# Patient Record
Sex: MALE | Race: WHITE
[De-identification: names, ages, dates, MRNs, and addresses within clinical notes are randomized per-mention and may not be internally consistent; named-entity substitution may affect disease eponyms.]

---

## 2018-01-24 ENCOUNTER — HOSPITAL ENCOUNTER (OUTPATIENT)
Dept: HOSPITAL 74 - JER | Age: 68
Setting detail: OBSERVATION
LOS: 1 days | Discharge: LEFT BEFORE DISCHARGE | End: 2018-01-25
Payer: COMMERCIAL

## 2018-01-24 VITALS — BODY MASS INDEX: 28.2 KG/M2

## 2018-01-24 DIAGNOSIS — Z95.5: ICD-10-CM

## 2018-01-24 DIAGNOSIS — M19.90: ICD-10-CM

## 2018-01-24 DIAGNOSIS — E78.5: ICD-10-CM

## 2018-01-24 DIAGNOSIS — J18.9: ICD-10-CM

## 2018-01-24 DIAGNOSIS — I25.2: ICD-10-CM

## 2018-01-24 DIAGNOSIS — I25.10: ICD-10-CM

## 2018-01-24 DIAGNOSIS — F17.210: ICD-10-CM

## 2018-01-24 DIAGNOSIS — M54.5: ICD-10-CM

## 2018-01-24 DIAGNOSIS — R07.81: ICD-10-CM

## 2018-01-24 DIAGNOSIS — Z79.82: ICD-10-CM

## 2018-01-24 DIAGNOSIS — G89.29: ICD-10-CM

## 2018-01-24 DIAGNOSIS — I10: ICD-10-CM

## 2018-01-24 DIAGNOSIS — J44.1: Primary | ICD-10-CM

## 2018-01-24 LAB
ALBUMIN SERPL-MCNC: 4.5 G/DL (ref 3.4–5)
ALP SERPL-CCNC: 78 U/L (ref 45–117)
ALT SERPL-CCNC: 33 U/L (ref 12–78)
ANION GAP SERPL CALC-SCNC: 13 MMOL/L (ref 8–16)
ARTERIAL BLOOD GAS PCO2: 36.4 MMHG (ref 35–45)
ARTERIAL PATENCY WRIST A: POSITIVE
AST SERPL-CCNC: 23 U/L (ref 15–37)
BASE EXCESS BLDA CALC-SCNC: 2.4 MEQ/L (ref -2–2)
BASOPHILS # BLD: 0.1 % (ref 0–2)
BILIRUB SERPL-MCNC: 0.6 MG/DL (ref 0.2–1)
BUN SERPL-MCNC: 7 MG/DL (ref 7–18)
CALCIUM SERPL-MCNC: 8.9 MG/DL (ref 8.5–10.1)
CHLORIDE SERPL-SCNC: 102 MMOL/L (ref 98–107)
CO2 SERPL-SCNC: 25 MMOL/L (ref 21–32)
COHGB MFR BLD: 1.2 GM% (ref 0.5–2)
CREAT SERPL-MCNC: 0.8 MG/DL (ref 0.7–1.3)
DEPRECATED RDW RBC AUTO: 12.6 % (ref 11.9–15.9)
EOSINOPHIL # BLD: 0 % (ref 0–4.5)
GLUCOSE SERPL-MCNC: 103 MG/DL (ref 74–106)
HCT VFR BLD CALC: 47.9 % (ref 35.4–49)
HGB BLD-MCNC: 16.3 GM/DL (ref 11.7–16.9)
LYMPHOCYTES # BLD: 2.4 % (ref 8–40)
MCH RBC QN AUTO: 31.8 PG (ref 25.7–33.7)
MCHC RBC AUTO-ENTMCNC: 34.1 G/DL (ref 32–35.9)
MCV RBC: 93.4 FL (ref 80–96)
MONOCYTES # BLD AUTO: 5.1 % (ref 3.8–10.2)
NEUTROPHILS # BLD: 92.4 % (ref 42.8–82.8)
PLATELET # BLD AUTO: 233 K/MM3 (ref 134–434)
PMV BLD: 9.1 FL (ref 7.5–11.1)
PO2 BLDA: 62.4 MMHG (ref 80–100)
POTASSIUM SERPLBLD-SCNC: 4.1 MMOL/L (ref 3.5–5.1)
PROT SERPL-MCNC: 7.9 G/DL (ref 6.4–8.2)
RBC # BLD AUTO: 5.13 M/MM3 (ref 4–5.6)
SAO2 % BLDA: 92.6 % (ref 90–98.9)
SODIUM SERPL-SCNC: 140 MMOL/L (ref 136–145)
WBC # BLD AUTO: 10.2 K/MM3 (ref 4–10)

## 2018-01-24 PROCEDURE — G0378 HOSPITAL OBSERVATION PER HR: HCPCS

## 2018-01-24 PROCEDURE — 3E0F7GC INTRODUCTION OF OTHER THERAPEUTIC SUBSTANCE INTO RESPIRATORY TRACT, VIA NATURAL OR ARTIFICIAL OPENING: ICD-10-PCS

## 2018-01-24 PROCEDURE — 3E0333Z INTRODUCTION OF ANTI-INFLAMMATORY INTO PERIPHERAL VEIN, PERCUTANEOUS APPROACH: ICD-10-PCS

## 2018-01-24 PROCEDURE — 3E03329 INTRODUCTION OF OTHER ANTI-INFECTIVE INTO PERIPHERAL VEIN, PERCUTANEOUS APPROACH: ICD-10-PCS

## 2018-01-24 RX ADMIN — IPRATROPIUM BROMIDE AND ALBUTEROL SULFATE SCH AMP: .5; 3 SOLUTION RESPIRATORY (INHALATION) at 17:15

## 2018-01-24 RX ADMIN — IPRATROPIUM BROMIDE AND ALBUTEROL SULFATE SCH AMP: .5; 3 SOLUTION RESPIRATORY (INHALATION) at 17:33

## 2018-01-24 RX ADMIN — IPRATROPIUM BROMIDE AND ALBUTEROL SULFATE SCH AMP: .5; 3 SOLUTION RESPIRATORY (INHALATION) at 17:00

## 2018-01-24 RX ADMIN — IPRATROPIUM BROMIDE AND ALBUTEROL SULFATE SCH AMP: .5; 3 SOLUTION RESPIRATORY (INHALATION) at 16:45

## 2018-01-24 NOTE — PDOC
Attending Attestation





- Resident


Resident Name: Venus Cha





- ED Attending Attestation


I have performed the following: I have examined & evaluated the patient, The 

case was reviewed & discussed with the resident, I agree w/resident's findings 

& plan, Exceptions are as noted





- HPI


HPI: 





01/24/18 16:41


67 M with h/o COPD, HTN, HLD, CAD, active smoker, presenting to ER with SOB 

that began yesterday. Pt states that he feels chest tightness and has a dry 

nonproductive cough. Pt states it feels like his prior COPD exacerbations. He 

has been using his inhalers with minimal relief. Denies F/C. Denies leg 

swelling.





- Physicial Exam


PE: 





01/24/18 16:44


"GENERAL: Awake, alert, and fully oriented, in no acute distress


HEAD: No signs of trauma


EYES: PERRLA, EOMI, sclera anicteric, conjunctiva clear


ENT: Auricles normal inspection, hearing grossly normal, nares patent, 

oropharynx clear without exudates. Moist mucosa


NECK: Nontender, no stepoffs, Normal ROM, supple, no lymphadenopathy, JVD, or 

masses


LUNGS: bilateral expiratory wheezes, no rales/rhonchi


HEART: Regular rate and rhythm, normal S1 and S2, no murmurs, rubs or gallops


ABDOMEN: Soft, nontender, normoactive bowel sounds.  No guarding, no rebound.  

No masses


EXTREMITIES: Normal range of motion, no edema.  No clubbing or cyanosis. No 

cords, erythema, or tenderness


NEUROLOGICAL: Cranial nerves II through XII intact. 5/5 strength and sensation 

in all extremities, Normal speech, normal gait


SKIN: Warm, Dry, normal turgor, no rashes or lesions noted.


"





- Medical Decision Making





01/24/18 16:45


67 M with likely COPD exacerbation. Given pt's significant cardiac history, 

will also r/o CAD and evaluate for CHF.


- Labs, BNP, trop


- CXR


- Nebs, steroids


- reassess





Pt signed out to oncoming attending at 7PM, pending labs, XR, re-evaluation, 

and possible admission to hospital.


Case discussed in detail with oncoming Emergency Physician including history, 

physical exam and ancillary studies. 


Oncoming Emergency Physician has assumed care for the patient and will complete 

the evaluation and treatment. 


Patient is aware of the plan.

## 2018-01-24 NOTE — PDOC
*Physical Exam





- Vital Signs


 Last Vital Signs











Temp Pulse Resp BP Pulse Ox


 


 98.7 F   100 H  25 H  149/81   97 


 


 01/24/18 13:54  01/24/18 18:00  01/24/18 18:00  01/24/18 13:54  01/24/18 18:00














- Physical Exam


Comments: 





01/24/18 22:31


GENERAL: Awake, alert, and fully oriented, in no acute distress


HEAD: No signs of trauma, normocephalic, atraumatic 


EYES: PERRLA, EOMI, sclera anicteric, conjunctiva clear


ENT: Hearing grossly normal, nares patent, oropharynx clear without


exudates. Moist mucosa


NECK: Normal ROM, supple,no  JVD, or masses


LUNGS: + Exp/Insp rhonci. Absent rales. 


HEART: Regular rate and rhythm, normal S1 and S2, no murmurs, rubs or gallops, 

peripheral pulses normal and equal bilaterally. 


ABDOMEN: Soft, nontender, normoactive bowel sounds.  No guarding, no rebound.  

No masses


EXTREMITIES : Normal inspection, Normal range of motion, no edema.  No clubbing 

or cyanosis. 


SKIN: Warm, Dry, normal turgor, no rashes or lesions noted. 











ED Treatment Course





- LABORATORY


CBC & Chemistry Diagram: 


 01/24/18 16:30





 01/24/18 17:37





- ADDITIONAL ORDERS


Additional order review: 


 Laboratory  Results











  01/24/18 01/24/18





  20:10 16:30


 


Anticoagulation Therapy  No Result Required. 


 


O2 Delivery Device  No Result Required. 


 


Oxygen Flow Rate  No Result Required. 


 


Vent Mode  No Result Required. 


 


Vent Rate  No Result Required. 


 


Mechanical Rate  No Result Required. 


 


Pressure Support Vent  No Result Required. 


 


B-Natriuretic Peptide   304.37 H




















 01/24/18 18:05 Influenza Types A,B Antigen (RILEY) - Final





 Nasopharyngeal Swab  - Final








 











  01/24/18





  16:30


 


RBC  5.13


 


MCV  93.4


 


MCHC  34.1


 


RDW  12.6


 


MPV  9.1


 


Neutrophils %  92.4 H


 


Lymphocytes %  2.4 L


 


Monocytes %  5.1


 


Eosinophils %  0.0


 


Basophils %  0.1














- Medications


Given in the ED: 


ED Medications














Discontinued Medications














Generic Name Dose Route Start Last Admin





  Trade Name Freq  PRN Reason Stop Dose Admin


 


Albuterol/Ipratropium  1 amp  01/24/18 16:45  01/24/18 17:33





  Duoneb -  NEB  01/24/18 17:31  1 amp





  Q15M DANY   Administration


 


Azithromycin  500 mg  01/24/18 16:35  01/24/18 16:44





  Azithromycin  PO  01/24/18 16:36  500 mg





  ONCE ONE   Administration


 


Methylprednisolone Sodium Succinate  125 mg  01/24/18 16:35  01/24/18 16:44





  Solu-Medrol -  IVPB  01/24/18 16:36  125 mg





  ONCE ONE   Administration














Medical Decision Making





- Medical Decision Making





01/24/18 22:29


67 y.o. male with h/o of COPD (not on home O2), HTN,  MI (s/p stent) who 

arrives to the ED tachypneic and hypoxic on RA with dyspnea x 1 day. Asx. w/

pleuritic chest pain. Physical exam notable for wheezing.


Recieved handoff from Dr. Cha. 


ED course notable for Duo Nebs x2, IV Solumedrol w/ resolution of wheezing but 

continued dyspnea.Ceftriaxone (1 mg QD) and Zithromax (500 mg BID) started. 

Case d/w patient's PCP Dr. Courtney, who  agrees with admission. Pulm consult 

(Twan) and ID consult (Crystal).Patient is pending admission .





ED Course: 





- Patient admitted to med/tele. Discussed with NP Crystal Matt. 


- Patient stable with improvement in resp status. Wheezing resolved. 








*DC/Admit/Observation/Transfer


Diagnosis at time of Disposition: 


 COPD (chronic obstructive pulmonary disease)








- Discharge Dispostion


Condition at time of disposition: Fair





- Referrals





- Patient Instructions





- Post Discharge Activity

## 2018-01-24 NOTE — HP
Admitting History and Physical





- Primary Care Physician


PCP: Brenna Courtney





- Admission


Chief Complaint: Dyspnea on exertion, cough, chest pain


History of Present Illness: 





This is a 66 y/o man from home. Who presents to the ED with increased HIGGINBOTHAM, non-

productive cough and chest pain x 1day. Patient reports having flu like 

symptoms x 1 week. Patient reports pluertic chest pain when coughing, then 

resolves. Patient reports noting increased labored breathing on exertion. He 

denies fever, chills, dizziness, AP, N/V/D, constipation, dysuria.


History Source: Patient


Limitations to Obtaining History: No Limitations





- Past Medical History


Cardiovascular: Yes: HTN, Hyperlipdemia, MI (s/p stent)


Pulmonary: Yes: COPD


Musculoskeletal: Yes: Chronic low back pain, Other (DJD)





- Past Surgical History


Past Surgical History: Yes: Stent





- Smoking History


Smoking history: Former smoker


Have you smoked in the past 12 months: No





- Alcohol/Substance Use


Hx Alcohol Use: No


History of Substance Use: reports: None





- Social History


Usual Living Arrangement: Yes: Alone


History of Recent Travel: No





Home Medications





- Allergies


Allergies/Adverse Reactions: 


 Allergies











Allergy/AdvReac Type Severity Reaction Status Date / Time


 


No Known Allergies Allergy   Verified 01/24/18 13:56














- Home Medications


Home Medications: 


Ambulatory Orders





Albuterol 0.083% Nebulizer Sol [Ventolin 0.083%] 1 neb NEB QID PRN 07/09/15 


Albuterol Sulfate Inhaler - [Ventolin HFA Inhaler -] 1 - 2 inh PO Q6H PRN #1 

inhaler 07/09/15 


Aspirin [Aspirin EC] 81 mg PO DAILY 07/09/15 


Lisinopril [Prinivil -] 40 mg PO DAILY 07/09/15 


Rosuvastatin Calcium [Crestor] 20 mg PO DAILY 07/09/15 


Salmeterol/Fluticasone [Advair 100Mcg/50Mcg -] 1 inh IH Q6H PRN #1 inh 07/09/15 











Review of Systems





- Review of Systems


Eyes: reports: No Symptoms


HENT: reports: No Symptoms


Cardiovascular: reports: Chest Pain, Shortness of Breath


Respiratory: reports: Cough, SOB, SOB on Exertion, Wheezing


Gastrointestinal: reports: No Symptoms


Genitourinary: reports: No Symptoms


Breasts: reports: No Symptoms Reported


Musculoskeletal: reports: Back Pain


Integumentary: reports: No Symptoms


Neurological: reports: No Symptoms


Endocrine: reports: No Symptoms


Hematology/Lymphatic: reports: No Symptoms


Psychiatric: reports: No Symptoms





Physical Examination


Vital Signs: 


 Vital Signs











Temperature  98.7 F   01/24/18 13:54


 


Pulse Rate  100 H  01/24/18 18:00


 


Respiratory Rate  25 H  01/24/18 18:00


 


Blood Pressure  149/81   01/24/18 13:54


 


O2 Sat by Pulse Oximetry (%)  97   01/24/18 18:00











Constitutional: Yes: Well Nourished, Moderate Distress


Eyes: Yes: WNL, Conjunctiva Clear, EOM Intact, PERRL


HENT: Yes: WNL, Atraumatic, Normocephalic


Neck: Yes: WNL, Supple, Trachea Midline


Cardiovascular: Yes: Tachycardia, S1, S2


Respiratory: Yes: Rhonchi, SOB, SOB on Exertion, Tachypnea, Wheezes


Gastrointestinal: Yes: Normal Bowel Sounds, Soft


Renal/: Yes: WNL


Breast(s): Yes: WNL


Musculoskeletal: Yes: WNL


Extremities: Yes: WNL


Edema: No


Peripheral Pulses WNL: Yes


Neurological: Yes: WNL, Alert, Oriented, Cran Nerves II-XII Intact


...Motor Strength: WNL, LUE, LLE, RUE, RLE


Psychiatric: Yes: WNL, Alert, Oriented


Labs: 


 CBC, BMP





 01/24/18 16:30 





 01/24/18 17:37 





 Laboratory Results - last 24 hr











  01/24/18 01/24/18 01/24/18





  16:30 16:30 17:37


 


WBC   10.2 H 


 


RBC   5.13 


 


Hgb   16.3 


 


Hct   47.9 


 


MCV   93.4 


 


MCH   31.8 


 


MCHC   34.1 


 


RDW   12.6 


 


Plt Count   233 


 


MPV   9.1 


 


Neutrophils %   92.4 H 


 


Lymphocytes %   2.4 L 


 


Monocytes %   5.1 


 


Eosinophils %   0.0 


 


Basophils %   0.1 


 


Anticoagulation Therapy   


 


Puncture Site   


 


ABG pH   


 


ABG pCO2 at Pt Temp   


 


ABG pO2 at Pt Temp   


 


ABG HCO3   


 


ABG O2 Sat (Measured)   


 


ABG O2 Content   


 


ABG Base Excess   


 


Amador Test   


 


Carboxyhemoglobin   


 


Methemoglobin   


 


O2 Delivery Device   


 


Oxygen Flow Rate   


 


Vent Mode   


 


Vent Rate   


 


Mechanical Rate   


 


Pressure Support Vent   


 


Sodium    140


 


Potassium    4.1


 


Chloride    102


 


Carbon Dioxide    25


 


Anion Gap    13


 


BUN    7


 


Creatinine    0.8


 


Creat Clearance w eGFR    > 60


 


Random Glucose    103


 


Calcium    8.9


 


Total Bilirubin    0.6


 


AST    23


 


ALT    33


 


Alkaline Phosphatase    78


 


Creatine Kinase    138


 


Troponin I    < 0.02


 


B-Natriuretic Peptide  304.37 H  


 


Total Protein    7.9


 


Albumin    4.5














  01/24/18 01/24/18





  20:10 20:10


 


WBC  


 


RBC  


 


Hgb  


 


Hct  


 


MCV  


 


MCH  


 


MCHC  


 


RDW  


 


Plt Count  


 


MPV  


 


Neutrophils %  


 


Lymphocytes %  


 


Monocytes %  


 


Eosinophils %  


 


Basophils %  


 


Anticoagulation Therapy  No Result Required. 


 


Puncture Site  Right radial 


 


ABG pH  7.46 H 


 


ABG pCO2 at Pt Temp  36.4 


 


ABG pO2 at Pt Temp  62.4 L 


 


ABG HCO3  25.5 


 


ABG O2 Sat (Measured)  92.6 


 


ABG O2 Content  20.5 


 


ABG Base Excess  2.4 H 


 


Amador Test  Positive 


 


Carboxyhemoglobin   1.2


 


Methemoglobin   0.8


 


O2 Delivery Device  Nasal cannula 


 


Oxygen Flow Rate  4l 


 


Vent Mode  No Result Required. 


 


Vent Rate  No Result Required. 


 


Mechanical Rate  No Result Required. 


 


Pressure Support Vent  No Result Required. 


 


Sodium  


 


Potassium  


 


Chloride  


 


Carbon Dioxide  


 


Anion Gap  


 


BUN  


 


Creatinine  


 


Creat Clearance w eGFR  


 


Random Glucose  


 


Calcium  


 


Total Bilirubin  


 


AST  


 


ALT  


 


Alkaline Phosphatase  


 


Creatine Kinase  


 


Troponin I  


 


B-Natriuretic Peptide  


 


Total Protein  


 


Albumin  








 Intake & Output











 01/22/18 01/23/18 01/24/18 01/25/18





 23:59 23:59 23:59 23:59


 


Weight   94.347 kg 














Imaging





- Results


Chest X-ray: Report Reviewed, Image Reviewed


EKG: Image Reviewed





Problem List





- Problems


(1) COPD exacerbation


Code(s): J44.1 - CHRONIC OBSTRUCTIVE PULMONARY DISEASE W (ACUTE) EXACERBATION   





(2) Pleuritic chest pain


Code(s): R07.81 - PLEURODYNIA   





(3) Atypical pneumonia


Code(s): J18.9 - PNEUMONIA, UNSPECIFIED ORGANISM   





(4) CAD (coronary artery disease)


Code(s): I25.10 - ATHSCL HEART DISEASE OF NATIVE CORONARY ARTERY W/O ANG PCTRS 

  





(5) HTN (hypertension)


Code(s): I10 - ESSENTIAL (PRIMARY) HYPERTENSION   





(6) HLD (hyperlipidemia)


Code(s): E78.5 - HYPERLIPIDEMIA, UNSPECIFIED   





(7) DVT prophylaxis


Code(s): YUA3034 -    





Assessment/Plan





66 y/o man with a PMHx of: COPD, HTN, HLD, MI (stent), DJD. Placed Tele 

Observation for Acute COPD Exacerbation, Atypical Pneumonia.





Plan:


 1. COPD Exacerbation


- Acute


- No leukocytosis, no L-shift


- Chest xray report- no acute pathology


- Duonebs


- Solumederol given in ED


- Continue Solumederol taper


- Appreciate Pulm Consult


- O2


- Monitor CBC,BMP


- Monitor vitals








2. Atypical Pneumonia


- hx recent viral illness


- CURB65 1


- Blood Cultures-pending


- Urine Culture-pending


- Urine Legionella


- Sputum culture


- Appreciate plum


- Azithyromcin  given in ED


- Will add Ceftriaxone and continue for CAP


- O2


- Repeat CBC, BMP in am





3. Pleuritic Chest Pain


- Likely secondary to cough 


- Serial Enzymes


- Echo





4. CAD


- s/p ?stent vs angiogram


- Continue home meds


- EKG- ST with PVC





5. HTN


- Controlled


- Monitor BP


- Continue home meds


- Monitor renal function





6. FEN


- PO Fluids


- Replete lytes prn


- Low Na, Low Cholesterol Diet





7. Code Status: Full Code





Dispo: Obs




















Visit type





- Emergency Visit


Emergency Visit: Yes


ED Registration Date: 01/24/18


Care time: The patient presented to the Emergency Department on the above date 

and was hospitalized for further evaluation of their emergent condition.





- New Patient


This patient is new to me today: Yes


Date on this admission: 01/24/18





- Critical Care


Critical Care patient: No

## 2018-01-24 NOTE — PDOC
History of Present Illness





- General


Chief Complaint: Shortness of Breath


Stated Complaint: SOB


Time Seen by Provider: 01/24/18 15:44


History Source: Patient





- History of Present Illness


Initial Comments: 





01/24/18 18:20


Patient is a 67 y.o. with a PMH of COPD (not on home O2), HTN, DLD, Chronic 

Back Pain and MI (s/p stent) who presents to our ED today c/o 1 day h/o of 

dyspnea on exertion.  Patient endorses associated pleuritic chest pain but 

denes any cough.  Patient notes a recent viral URI (symptoms resolved 1 week 

previous) as well as h/o diarrhea yesterday following excess consumption of non-

pasteurized cheese. 








Past History





- Past Medical History


Allergies/Adverse Reactions: 


 Allergies











Allergy/AdvReac Type Severity Reaction Status Date / Time


 


No Known Allergies Allergy   Verified 01/24/18 13:56











Home Medications: 


Ambulatory Orders





Albuterol 0.083% Nebulizer Sol [Ventolin 0.083%] 1 neb NEB QID PRN 07/09/15 


Albuterol Sulfate Inhaler - [Ventolin HFA Inhaler -] 1 - 2 inh PO Q6H PRN #1 

inhaler 07/09/15 


Aspirin [Aspirin EC] 81 mg PO DAILY 07/09/15 


Levofloxacin [Levaquin -] 750 mg PO DAILY #10 tablet 07/09/15 


Lisinopril [Prinivil -] 40 mg PO DAILY 07/09/15 


Prednisone [Deltasone -] 40 mg PO DAILY #10 tablet 07/09/15 


Rosuvastatin Calcium [Crestor] 20 mg PO DAILY 07/09/15 


Salmeterol/Fluticasone [Advair 100Mcg/50Mcg -] 1 inh IH Q6H PRN #1 inh 07/09/15 








Cardiac Disorders: Yes


COPD: Yes


HTN: Yes


Hypercholesterolemia: Yes





- Suicide/Smoking/Psychosocial Hx


Smoking Status: No


Smoking History: Current some day smoker


Have you smoked in the past 12 months: No


Number of Cigarettes Smoked Daily: 1


Information on smoking cessation initiated: No


Hx Alcohol Use: No


Drug/Substance Use Hx: No


Substance Use Type: None





**Review of Systems





- Review of Systems


Constitutional: No: Chills, Fever


Respiratory: Yes: Shortness of Breath


Cardiac (ROS): Yes: Other (Pleuritic Chest Pain)


ABD/GI: No: Constipated, Diarrhea, Nausea, Vomiting


: No: Burning, Dysuria


All Other Systems: Reviewed and Negative





*Physical Exam





- Vital Signs


 Last Vital Signs











Temp Pulse Resp BP Pulse Ox


 


 98.7 F   114 H  25 H  149/81   96 


 


 01/24/18 13:54  01/24/18 14:50  01/24/18 16:01  01/24/18 13:54  01/24/18 14:50














- Physical Exam


General Appearance: Yes: Nourished, Appropriately Dressed


HEENT: positive: EOMI, OFE


Neck: positive: Trachea midline, Supple


Respiratory/Chest: positive: Rapid RR, Wheezing.  negative: Accessory Muscle Use

, Labored Respiration


Cardiovascular: positive: S1, S2


Integumentary: positive: Normal Color, Dry, Warm


Neurologic: positive: Fully Oriented, Alert





ED Treatment Course





- LABORATORY


CBC & Chemistry Diagram: 


 01/24/18 16:30





 01/24/18 17:37





Medical Decision Making





- Medical Decision Making





01/24/18 20:17


Patient is a 67 y.o. male who presents to the ED c/o dyspnea.  On PE patient is 

hypoxic on RA, tachypneic and has significant wheezing on PE.  Patient given 

Duo Nebs x2 + IV Solumedrol with resolution of wheezing but continued dyspnea 

and use of NC with SpO2 @ 94%.  Will admit patient for further stabilization 

including pulmonary consult.  Case d/w patient's PCP - Dr. Courtney, agrees 

with admission.  Request Ceftriaxone (1 mg QD) and Zithromax (500 mg BID) as 

well as pulmonary consult ([place]wan) and ID consult (Crystal).  Patient signed out 

to Dr. Kirill Mishra with planned disposition of admission following full medical 

evaluation with evaluation of CMP labs.  Patient's PCP, Dr. Courtney, 

requests callback when patient's lab results complete: [phone]. 














*DC/Admit/Observation/Transfer


Diagnosis at time of Disposition: 


 COPD (chronic obstructive pulmonary disease)








- Discharge Dispostion


Condition at time of disposition: Fair


Admit: Yes





- Referrals


Referrals: 


Brenna Courtney MD [Primary Care Provider] - 





- Patient Instructions





- Post Discharge Activity

## 2018-01-25 VITALS — HEART RATE: 93 BPM | SYSTOLIC BLOOD PRESSURE: 126 MMHG | DIASTOLIC BLOOD PRESSURE: 76 MMHG

## 2018-01-25 VITALS — TEMPERATURE: 98.3 F

## 2018-01-25 LAB
ANION GAP SERPL CALC-SCNC: 11 MMOL/L (ref 8–16)
BASOPHILS # BLD: 0.1 % (ref 0–2)
BUN SERPL-MCNC: 11 MG/DL (ref 7–18)
CALCIUM SERPL-MCNC: 8.7 MG/DL (ref 8.5–10.1)
CHLORIDE SERPL-SCNC: 103 MMOL/L (ref 98–107)
CO2 SERPL-SCNC: 27 MMOL/L (ref 21–32)
CREAT SERPL-MCNC: 0.7 MG/DL (ref 0.7–1.3)
DEPRECATED RDW RBC AUTO: 12.6 % (ref 11.9–15.9)
EOSINOPHIL # BLD: 0 % (ref 0–4.5)
GLUCOSE SERPL-MCNC: 180 MG/DL (ref 74–106)
HCT VFR BLD CALC: 44.6 % (ref 35.4–49)
HGB BLD-MCNC: 14.9 GM/DL (ref 11.7–16.9)
LYMPHOCYTES # BLD: 4.5 % (ref 8–40)
MAGNESIUM SERPL-MCNC: 2.1 MG/DL (ref 1.8–2.4)
MCH RBC QN AUTO: 31.4 PG (ref 25.7–33.7)
MCHC RBC AUTO-ENTMCNC: 33.5 G/DL (ref 32–35.9)
MCV RBC: 93.8 FL (ref 80–96)
MONOCYTES # BLD AUTO: 6.3 % (ref 3.8–10.2)
NEUTROPHILS # BLD: 89.1 % (ref 42.8–82.8)
PHOSPHATE SERPL-MCNC: 3.7 MG/DL (ref 2.5–4.9)
PLATELET # BLD AUTO: 207 K/MM3 (ref 134–434)
PMV BLD: 8.7 FL (ref 7.5–11.1)
POTASSIUM SERPLBLD-SCNC: 4 MMOL/L (ref 3.5–5.1)
RBC # BLD AUTO: 4.76 M/MM3 (ref 4–5.6)
SODIUM SERPL-SCNC: 141 MMOL/L (ref 136–145)
WBC # BLD AUTO: 7.9 K/MM3 (ref 4–10)

## 2018-01-25 RX ADMIN — METHYLPREDNISOLONE SODIUM SUCCINATE SCH MG: 40 INJECTION, POWDER, FOR SOLUTION INTRAMUSCULAR; INTRAVENOUS at 15:05

## 2018-01-25 RX ADMIN — METHYLPREDNISOLONE SODIUM SUCCINATE SCH MG: 40 INJECTION, POWDER, FOR SOLUTION INTRAMUSCULAR; INTRAVENOUS at 05:16

## 2018-01-25 RX ADMIN — METHYLPREDNISOLONE SODIUM SUCCINATE SCH MG: 40 INJECTION, POWDER, FOR SOLUTION INTRAMUSCULAR; INTRAVENOUS at 10:00

## 2018-01-25 NOTE — CON.PULM
Consult


Consult Specialty:: PULM/CCM


Referred by:: BELLA


Reason for Consultation:: SOB





- History of Present Illness


Chief Complaint: SOB / COUGH


History of Present Illness: 


67 M, with listed medical history including COPD from smoking.  Admitted via 

the ER due to progressive HIGGINBOTHAM, non-productive cough, and pleuritic type chest 

pain for the past 1 week. 





No travel history or sick contacts.  No subjective fever or chills. 





No night sweats or hemoptysis. 





No associated GI or  symptoms. 





CXR: Clear 








- History Source


History Provided By: Patient


Limitations to Obtaining History: No Limitations





- Past Medical History


Cardio/Vascular: Yes: HTN, Hyperlipdemia, MI (s/p stent)


Pulmonary: Yes: COPD


Musculoskeletal: Yes: Chronic low back pain, Other (DJD)





- Past Surgical History


Past Surgical History: Yes: Stent





- Alcohol/Substance Use


Hx Alcohol Use: No


History of Substance Use: reports: None





- Smoking History


Smoking history: Former smoker


Have you smoked in the past 12 months: No


Aproximately how many cigarettes per day: 1





- Social History


History of Recent Travel: No





Home Medications





- Allergies


Allergies/Adverse Reactions: 


 Allergies











Allergy/AdvReac Type Severity Reaction Status Date / Time


 


No Known Allergies Allergy   Verified 18 13:56














- Home Medications


Home Medications: 


Ambulatory Orders





Albuterol 0.083% Nebulizer Sol [Ventolin 0.083%] 1 neb NEB QID PRN 07/09/15 


Albuterol Sulfate Inhaler - [Ventolin HFA Inhaler -] 1 - 2 inh PO Q6H PRN #1 

inhaler 07/09/15 


Aspirin [Aspirin EC] 81 mg PO DAILY 07/09/15 


Lisinopril [Prinivil -] 40 mg PO DAILY 07/09/15 


Rosuvastatin Calcium [Crestor] 20 mg PO DAILY 07/09/15 


Salmeterol/Fluticasone [Advair 100Mcg/50Mcg -] 1 inh IH Q6H PRN #1 inh 07/09/15 











Family Disease History





- Family Disease History


Family Disease History: Diabetes: Father, Heart Disease: Mother ( age 62

), Other: Sister (well, alive)





Review of Systems





- Review of Systems


Constitutional: reports: Malaise, Weakness.  denies: Chills, Fever, Loss of 

Appetite, Night Sweats


Eyes: reports: No Symptoms


HENT: reports: No Symptoms


Neck: reports: No Symptoms


Cardiovascular: reports: Chest Pain, Shortness of Breath.  denies: Edema, 

Palpitations


Respiratory: reports: Cough, Snoring, SOB, SOB on Exertion, Wheezing.  denies: 

Hemoptysis


Gastrointestinal: reports: No Symptoms


Genitourinary: reports: No Symptoms


Breasts: reports: No Symptoms Reported


Musculoskeletal: reports: No Symptoms


Integumentary: reports: No Symptoms


Neurological: reports: No Symptoms


Endocrine: reports: No Symptoms


Hematology/Lymphatic: reports: No Symptoms


Psychiatric: reports: No Symptoms





Physical Exam


Vital Sings: 


 Vital Signs











Temperature  98.3 F   18 13:57


 


Pulse Rate  91 H  18 13:57


 


Respiratory Rate  16   18 13:57


 


Blood Pressure  121/76   18 13:57


 


O2 Sat by Pulse Oximetry (%)  94 L  18 13:57











Constitutional: Yes: No Distress, Calm


Eyes: Yes: Conjunctiva Clear, EOM Intact


HENT: Yes: Atraumatic, Normocephalic


Neck: Yes: Supple, Trachea Midline


Cardiovascular: Yes: Regular Rate and Rhythm


Respiratory: Yes: Cough, Diminished, Rhonchi, SOB, Tachypnea, Wheezes.  No: 

Accessory Muscle Use, On Nasal O2, Rales, Stridor


...Inspection: Yes: WNL


...Clubbing: No


Gastrointestinal: Yes: Normal Bowel Sounds, Soft


Renal/: Yes: WNL


Musculoskeletal: Yes: WNL


Extremities: Yes: WNL


Edema: No


Peripheral Pulses WNL: Yes


Integumentary: Yes: WNL


Neurological: Yes: WNL, Alert, Oriented


...Motor Strength: WNL


Psychiatric: Yes: WNL, Alert, Oriented


Labs: 


 CBC, BMP





 18 06:20 





 18 06:20 





 ABG Results











ABG pH  7.46  (7.35-7.45)  H  18  20:10    


 


ABG pCO2 at Pt Temp  36.4 mmHg (35-45)   18  20:10    


 


ABG pO2 at Pt Temp  62.4 mmHg ()  L  18  20:10    


 


ABG HCO3  25.5 meq/L (22-26)   18  20:10    


 


ABG O2 Sat (Measured)  92.6 % (90-98.9)   18  20:10    


 


ABG O2 Content  20.5 % vol (15-22)   18  20:10    


 


ABG Base Excess  2.4 meq/l (-2-2)  H  18  20:10    














Imaging





- Results


Chest X-ray: Report Reviewed, Image Reviewed





Problem List





- Problems


(1) CAD (coronary artery disease)


Code(s): I25.10 - ATHSCL HEART DISEASE OF NATIVE CORONARY ARTERY W/O ANG PCTRS 

  





(2) COPD (chronic obstructive pulmonary disease)


Code(s): J44.9 - CHRONIC OBSTRUCTIVE PULMONARY DISEASE, UNSPECIFIED   





(3) Chronic back pain


Code(s): M54.9 - DORSALGIA, UNSPECIFIED; G89.29 - OTHER CHRONIC PAIN   





(4) DJD (degenerative joint disease)


Code(s): M19.90 - UNSPECIFIED OSTEOARTHRITIS, UNSPECIFIED SITE   





(5) HLD (hyperlipidemia)


Code(s): E78.5 - HYPERLIPIDEMIA, UNSPECIFIED   





(6) HTN (hypertension)


Code(s): I10 - ESSENTIAL (PRIMARY) HYPERTENSION   





(7) Pleuritic chest pain


Code(s): R07.81 - PLEURODYNIA   





(8) COPD exacerbation


Code(s): J44.1 - CHRONIC OBSTRUCTIVE PULMONARY DISEASE W (ACUTE) EXACERBATION   





Assessment/Plan


Medrol 


O2 as needed


Low threshold to stop ABX given clear CXR, normal WBC, and he is afebrile 


BD TX 


VTE prophylaxis 


On discharge home should be on LAMA/LABA/ICS


No smoking 


Hopefully should be ready for D/C within 24 hours as he reports feeling better


Will follow 





Thank you. 





Dr Leija

## 2018-01-25 NOTE — CON.ID
Consult


Consult Specialty:: infectious diseases


Reason for Consultation:: hypoxia,sob





- History of Present Illness


Chief Complaint: sob


History of Present Illness: 





68 y/o man from home. Who presented to the ED and still in ed with increased sob

,dry cough. Patient reports having flu like symptoms x 1 week. Patient reports 

pluertic chest pain when coughing, then resolves. Patient reports noting 

increased labored breathing on exertion. He denies fever, chills, dizziness, AP

, N/V/D, constipation, dysuria.


currently the patient feels well and says he is going to go home this evening


his breathing it seems has got better


patient has received steroids and abx uptill now


no other symptoms,no sick contacts





- History Source


History Provided By: Patient


Limitations to Obtaining History: No Limitations





- Past Medical History


Cardio/Vascular: Yes: HTN, Hyperlipdemia, MI (s/p stent)


Pulmonary: Yes: COPD


Musculoskeletal: Yes: Chronic low back pain, Other (DJD)





- Past Surgical History


Past Surgical History: Yes: Stent





- Alcohol/Substance Use


Hx Alcohol Use: No


History of Substance Use: reports: None





- Smoking History


Smoking history: Former smoker


Have you smoked in the past 12 months: No


Aproximately how many cigarettes per day: 1





- Social History


History of Recent Travel: No





Home Medications





- Allergies


Allergies/Adverse Reactions: 


 Allergies











Allergy/AdvReac Type Severity Reaction Status Date / Time


 


No Known Allergies Allergy   Verified 18 13:56














- Home Medications


Home Medications: 


Ambulatory Orders





Albuterol 0.083% Nebulizer Sol [Ventolin 0.083%] 1 neb NEB QID PRN 07/09/15 


Albuterol Sulfate Inhaler - [Ventolin HFA Inhaler -] 1 - 2 inh PO Q6H PRN #1 

inhaler 07/09/15 


Aspirin [Aspirin EC] 81 mg PO DAILY 07/09/15 


Lisinopril [Prinivil -] 40 mg PO DAILY 07/09/15 


Rosuvastatin Calcium [Crestor] 20 mg PO DAILY 07/09/15 


Salmeterol/Fluticasone [Advair 100Mcg/50Mcg -] 1 inh IH Q6H PRN #1 inh 07/09/15 











Family Disease History





- Family Disease History


Family Disease History: Diabetes: Father, Heart Disease: Mother ( age 62

), Other: Sister (well, alive)





Review of Systems





- Review of Systems


Constitutional: reports: No Symptoms


Eyes: reports: No Symptoms


HENT: reports: No Symptoms


Neck: reports: No Symptoms


Cardiovascular: reports: No Symptoms


Respiratory: reports: Cough, SOB, SOB on Exertion, Wheezing


Gastrointestinal: reports: No Symptoms


Genitourinary: reports: No Symptoms


Musculoskeletal: reports: No Symptoms


Integumentary: reports: No Symptoms


Neurological: reports: No Symptoms


Endocrine: reports: No Symptoms


Hematology/Lymphatic: reports: No Symptoms


Psychiatric: reports: No Symptoms





Physical Exam


Vital Signs: 


 Vital Signs











Temperature  98.3 F   18 13:57


 


Pulse Rate  91 H  18 13:57


 


Respiratory Rate  16   18 13:57


 


Blood Pressure  121/76   18 13:57


 


O2 Sat by Pulse Oximetry (%)  94 L  18 13:57











Constitutional: Yes: No Distress, Calm


Cardiovascular: Yes: Regular Rate and Rhythm


Respiratory: Yes: Poor Air Entry, Other (patient resp effort is very poor)


Gastrointestinal: Yes: Normal Bowel Sounds, Soft


Musculoskeletal: Yes: WNL


Extremities: Yes: WNL


Neurological: Yes: Alert, Oriented


Psychiatric: Yes: Alert, Oriented


Labs: 


 CBC, BMP





 18 06:20 





 18 06:20 











Imaging





- Results


Chest X-ray: Report Reviewed, Image Reviewed





Assessment/Plan





patient coming in with copd exacerebration


his recent history of flu makes him high risk for infection


currently it does not  look like he has any infection going on


i would be be very careful in him





Problem List





- Problems


(1) CAD (coronary artery disease)


Code(s): I25.10 - ATHSCL HEART DISEASE OF NATIVE CORONARY ARTERY W/O ANG PCTRS 

  





(2) COPD (chronic obstructive pulmonary disease)


Code(s): J44.9 - CHRONIC OBSTRUCTIVE PULMONARY DISEASE, UNSPECIFIED   





(3) Chronic back pain


Code(s): M54.9 - DORSALGIA, UNSPECIFIED; G89.29 - OTHER CHRONIC PAIN   





(4) DJD (degenerative joint disease)


Code(s): M19.90 - UNSPECIFIED OSTEOARTHRITIS, UNSPECIFIED SITE   





(5) HLD (hyperlipidemia)


Code(s): E78.5 - HYPERLIPIDEMIA, UNSPECIFIED   





(6) HTN (hypertension)


Code(s): I10 - ESSENTIAL (PRIMARY) HYPERTENSION   





(7) Pleuritic chest pain


Code(s): R07.81 - PLEURODYNIA   





(8) COPD exacerbation


Code(s): J44.1 - CHRONIC OBSTRUCTIVE PULMONARY DISEASE W (ACUTE) EXACER








plan





i will discontinue ceftriaxone


if patient decides to go home i would send him on zithro for 4 more days


rest as per primary


incentive jordi

## 2018-01-25 NOTE — EKG
Test Reason : 

Blood Pressure : ***/*** mmHG

Vent. Rate : 113 BPM     Atrial Rate : 113 BPM

   P-R Int : 140 ms          QRS Dur : 080 ms

    QT Int : 316 ms       P-R-T Axes : 068 001 050 degrees

   QTc Int : 433 ms

 

*** POOR DATA QUALITY, INTERPRETATION MAY BE ADVERSELY AFFECTED

SINUS TACHYCARDIA WITH PREMATURE SUPRAVENTRICULAR COMPLEXES

OTHERWISE NORMAL ECG

WHEN COMPARED WITH ECG OF 09-SEP-2005 16:35,

PREMATURE SUPRAVENTRICULAR COMPLEXES ARE NOW PRESENT

Confirmed by ROBERTO MCCULLOUGH, ROBIN ([id number]) on 1/25/2018 10:32:43 AM

 

Referred By:             Confirmed By:ROBIN MEJIA MD